# Patient Record
(demographics unavailable — no encounter records)

---

## 2024-11-14 NOTE — HISTORY OF PRESENT ILLNESS
[FreeTextEntry1] : 17 y/o F here for pediatric weight management program evaluation. Referred by Dr. [facility] Hx: Obesity, Irregular periods Patient's Goal: Lose weight, eat more fruits and vegetables Patient's concerns: doesn't feel comfortable Parent's Concerns: wants daughter to learn to eat healthier and be able to support her  Dietary Intake Review: Food recall- B- 1/2 bagel, L- hotdog and corn, chips, snack- pasta salad with oranges, D- turkey burger on vickie with sweet potato fries, Dessert- cake Enjoys salads, cucumbers, shrimp, chicken, hummus Servings of fruit: 1 Servings of Vegetables: 2 Eats dinner at the table with family: 7 x per week Eats breakfast : 4 x per week Fast food or take-out: 4 x per week, eats take out, no fast food Water Intake: 5 bottles of water per day Soda/Juice/or Gatorade: juice sometimes, no soda, no gateraid Milk: no Physical Activity: Enjoys swimming, was on the swim team last year[place] and active throughout the day Sleep: ? off and on TV/ computer or phone in bedroom- yes Gyn Hx: Menarche- 12 years old, LMP- 6/30/23, has Irregular, long, and heavy periods. Denies hirsuitism, + acne  8/29/23: Lost 11 lbs. Feels well, has been tolerating 3 tabs of metformin per day. Denies GI side effects and feels that it is helping with portion control. Able to fit into clothes from a few years ago. Reviewed lab results with pt and her mother- given print out to bring to her upcoming GYN apt. Le reports making healthier food choices and feels full quicker.  10/3/23: Lost 23 lbs total. Feels better in clothes and feels that metformin was helpful with cravings . Got up to 4 tabs per day, but started to feel gas pains and vomited one night when she took it late at night. Reports eating better overall and smaller portions. Saw GYN who plans on doing a US and starting birth control.  11/7/23: Lost 10 lbs, down 33 lbs total. Has been able to tolerate 500-1000 mg of metformin daily, starts to get stomach discomfort when taking 2 tabs daily. Continues to focus on eating healthier and limiting snacking to 2 times per day. Feels less hunger while taking medication. Walking for exercise. Plans on starting birth control  1/25/24: B- granola bar, L- sandwhich and chips and cucumber, D- meat, vegetable, and potato. Feels more hungry since she if off metformin x 3 weeks. + Dizziness with brisk movement, notices when getting out of bed quickly or standing quickly. Denies feeling lightheaded or diaphoretic. BP lower since weight loss. Noticed that the dizziness started after starting Blisovi fe OCP. Plans on changing to [place] this week instead. Discontinued taking metformin 3 weeks ago and continues to experience these symptoms. Does report taking metformin with advil sinus, but continues to have symptoms of low BP while off metformin.  2/15/24: Feels better overall. 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 Feels better after switching oral contraceptive to [place]is. Saw ENT and currently taking antibiotics and steroids for sinus issues. Eating healthy foods throughout the day, but has trouble consistently eating breakfast. Open to starting back on Metformin  4/30/24: Lost 48 lbs total. Discontinued taking birth control d/t side effects. Taking & tolerating metformin 500 mg with dinner. Incorporating breakfast lately B- apple with cheerios, sometimes skips lunch so will eat a small meal after school or some snacks, D- regular meal. Walks 1-2 miles most days, playing tennis today.  11/14/24: Lost an additional 6 lbs. In a school play currently. Continues to take metformin 750 mg with evening meal, feels that it isn't as effective with additional weight loss or cravings. Continues to make healthy food choices overall. Periods are very irregular, denies taking birth control at present, felt that it was making her gain weight. Wants to lose additional weight, wants to discuss option for injectable medications for weight.

## 2024-11-14 NOTE — ASSESSMENT
[FreeTextEntry1] : Continue healthy lifestyle Discussed the option to treat obesity with GLP1 medication. Patient denies contraindications to taking: denies family hx or personal history of medullary thyroid carcinoma or MEN 2, denies history of pancreatitis/gallbladder disease/hypoglycemia/renal impairment/ suicidal ideation.  Discussed mechanism of action. Reviewed side effects including: N/V/D/C, injection site reactions, headaches, low blood sugar, dizziness, abdominal pain, and fatigue. Reviewed ways to decrease nausea including: eating bland/lowfat foods, eating foods that contain water, and avoiding lying flat after eating. Discussed injection education & titration schedule. Will try for wegovy first, explained the option to use a coupon if it is not covered RTO in 1 month

## 2024-12-12 NOTE — HISTORY OF PRESENT ILLNESS
[FreeTextEntry1] : 15 y/o F here for pediatric weight management program evaluation. Referred by Dr. [facility] Hx: Obesity, Irregular periods Patient's Goal: Lose weight, eat more fruits and vegetables Patient's concerns: doesn't feel comfortable Parent's Concerns: wants daughter to learn to eat healthier and be able to support her  Dietary Intake Review: Food recall- B- 1/2 bagel, L- hotdog and corn, chips, snack- pasta salad with oranges, D- turkey burger on vickie with sweet potato fries, Dessert- cake Enjoys salads, cucumbers, shrimp, chicken, hummus Servings of fruit: 1 Servings of Vegetables: 2 Eats dinner at the table with family: 7 x per week Eats breakfast : 4 x per week Fast food or take-out: 4 x per week, eats take out, no fast food Water Intake: 5 bottles of water per day Soda/Juice/or Gatorade: juice sometimes, no soda, no gateraid Milk: no Physical Activity: Enjoys swimming, was on the swim team last year, [place] and active throughout the day Sleep: ? off and on TV/ computer or phone in bedroom- yes Gyn Hx: Menarche- 12 years old, LMP- 6/30/23, has Irregular, long, and heavy periods. Denies hirsuitism, + acne  8/29/23: Lost 11 lbs. Feels well, has been tolerating 3 tabs of metformin per day. Denies GI side effects and feels that it is helping with portion control. Able to fit into clothes from a few years ago. Reviewed lab results with pt and her mother- given print out to bring to her upcoming GYN apt. Le reports making healthier food choices and feels full quicker.  10/3/23: Lost 23 lbs total. Feels better in clothes and feels that metformin was helpful with cravings . Got up to 4 tabs per day, but started to feel gas pains and vomited one night when she took it late at night. Reports eating better overall and smaller portions. Saw GYN who plans on doing a US and starting birth control.  11/7/23: Lost 10 lbs, down 33 lbs total. Has been able to tolerate 500-1000 mg of metformin daily, starts to get stomach discomfort when taking 2 tabs daily. Continues to focus on eating healthier and limiting snacking to 2 times per day. Feels less hunger while taking medication. Walking for exercise. Plans on starting birth control  1/25/24: B- granola bar, L- sandwhich and chips and cucumber, D- meat, vegetable, and potato. Feels more hungry since she if off metformin x 3 weeks. + Dizziness with brisk movement, notices when getting out of bed quickly or standing quickly. Denies feeling lightheaded or diaphoretic. BP lower since weight loss. Noticed that the dizziness started after starting Blisovi fe OCP. Plans on changing to [place] this week instead. Discontinued taking metformin 3 weeks ago and continues to experience these symptoms. Does report taking metformin with advil sinus, but continues to have symptoms of low BP while off metformin.  2/15/24: Feels better overall. 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 Feels better after switching oral contraceptive to louis. Saw ENT and currently taking antibiotics and steroids for sinus issues. Eating healthy foods throughout the day, but has trouble consistently eating breakfast. Open to starting back on Metformin  4/30/24: Lost 48 lbs total. Discontinued taking birth control d/t side effects. Taking & tolerating metformin 500 mg with dinner. Incorporating breakfast lately B- apple with cheerios, sometimes skips lunch so will eat a small meal after school or some snacks, D- regular meal. Walks 1-2 miles most days, playing tennis today.  11/14/24: Lost an additional 6 lbs. In a school play currently. Continues to take metformin 750 mg with evening meal, feels that it isn't as effective with additional weight loss or cravings. Continues to make healthy food choices overall. Periods are very irregular, denies taking birth control at present, felt that it was making her gain weight. Wants to lose additional weight, wants to discuss option for injectable medications for weight.   12/12/24: Lost 6 lbs. Continues to take metformin 750 mg tab once daily, sometimes inconsistently. Wants to discuss option for additional medicine. Insurance denied coverage for wegovy, wants to discuss self pay instead. Reports being less hungry overall, eats 2 meals per day and has been active with school plays.

## 2024-12-12 NOTE — ASSESSMENT
[FreeTextEntry1] : Plan to start the Semaglutide treatment is agreed upon. Proposal to purchase semaglutide (Ozempic) for self-pay at the best available rate and self-administer the drug at the prescribed dose. Continue the Metformin treatment on a consistent basis and alongside the new treatment. - Plan : - Continue with the medication Metformin, Le will continue to take her usual 750 mg per day. - Begin the treatment of semaglutide, with the first injection administered at the follow-up visit in January. - Le to continue eating two meals a day and performing in her plays to maintain physical activity. - Follow-up appointment scheduled for January 2nd to administer the first dose of semaglutide.

## 2025-01-02 NOTE — HISTORY OF PRESENT ILLNESS
[FreeTextEntry1] : 17 y/o F here for pediatric weight management program evaluation. Referred by Dr. [facility] Hx: Obesity, Irregular periods Patient's Goal: Lose weight, eat more fruits and vegetables Patient's concerns: doesn't feel comfortable Parent's Concerns: wants daughter to learn to eat healthier and be able to support her  Dietary Intake Review: Food recall- B- 1/2 bagel, L- hotdog and corn, chips, snack- pasta salad with oranges, D- turkey burger on vickie with sweet potato fries, Dessert- cake Enjoys salads, cucumbers, shrimp, chicken, hummus Servings of fruit: 1 Servings of Vegetables: 2 Eats dinner at the table with family: 7 x per week Eats breakfast : 4 x per week Fast food or take-out: 4 x per week, eats take out, no fast food Water Intake: 5 bottles of water per day Soda/Juice/or Gatorade: juice sometimes, no soda, no gateraid Milk: no Physical Activity: Enjoys swimming, was on the swim team last year, [place] and active throughout the day Sleep: ? off and on TV/ computer or phone in bedroom- yes Gyn Hx: Menarche- 12 years old, LMP- 6/30/23, has Irregular, long, and heavy periods. Denies hirsuitism, + acne  8/29/23: Lost 11 lbs. Feels well, has been tolerating 3 tabs of metformin per day. Denies GI side effects and feels that it is helping with portion control. Able to fit into clothes from a few years ago. Reviewed lab results with pt and her mother- given print out to bring to her upcoming GYN apt. Le reports making healthier food choices and feels full quicker.  10/3/23: Lost 23 lbs total. Feels better in clothes and feels that metformin was helpful with cravings . Got up to 4 tabs per day, but started to feel gas pains and vomited one night when she took it late at night. Reports eating better overall and smaller portions. Saw GYN who plans on doing a US and starting birth control.  11/7/23: Lost 10 lbs, down 33 lbs total. Has been able to tolerate 500-1000 mg of metformin daily, starts to get stomach discomfort when taking 2 tabs daily. Continues to focus on eating healthier and limiting snacking to 2 times per day. Feels less hunger while taking medication. Walking for exercise. Plans on starting birth control  1/25/24: B- granola bar, L- sandwhich and chips and cucumber, D- meat, vegetable, and potato. Feels more hungry since she if off metformin x 3 weeks. + Dizziness with brisk movement, notices when getting out of bed quickly or standing quickly. Denies feeling lightheaded or diaphoretic. BP lower since weight loss. Noticed that the dizziness started after starting Blisovi fe OCP. Plans on changing to [place] this week instead. Discontinued taking metformin 3 weeks ago and continues to experience these symptoms. Does report taking metformin with advil sinus, but continues to have symptoms of low BP while off metformin.  2/15/24: Feels better overall. 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 Feels better after switching oral contraceptive to louis. Saw ENT and currently taking antibiotics and steroids for sinus issues. Eating healthy foods throughout the day, but has trouble consistently eating breakfast. Open to starting back on Metformin  4/30/24: Lost 48 lbs total. Discontinued taking birth control d/t side effects. Taking & tolerating metformin 500 mg with dinner. Incorporating breakfast lately B- apple with cheerios, sometimes skips lunch so will eat a small meal after school or some snacks, D- regular meal. Walks 1-2 miles most days, playing tennis today.  11/14/24: Lost an additional 6 lbs. In a school play currently. Continues to take metformin 750 mg with evening meal, feels that it isn't as effective with additional weight loss or cravings. Continues to make healthy food choices overall. Periods are very irregular, denies taking birth control at present, felt that it was making her gain weight. Wants to lose additional weight, wants to discuss option for injectable medications for weight.   12/12/24: Lost 6 lbs. Continues to take metformin 750 mg tab once daily, sometimes inconsistently. Wants to discuss option for additional medicine. Insurance denied coverage for wegovy, wants to discuss self pay instead. Reports being less hungry overall, eats 2 meals per day and has been active with school plays.    1/2/25: Patient is starting Ozempic treatment, brought in medication today- has 2 mg dose pen and need education to start treatment. Has been inconsistent with metformin treatment while on school break.  Patient recently visited [place], gained 3 lbs during holiday season.

## 2025-01-02 NOTE — ASSESSMENT
[FreeTextEntry1] : Can continue metformin and healthy lifestyle Mother and father present for education- Discussed ozempic click based therapy dosing. Patient denies contraindications to taking: denies family hx or personal history of medullary thyroid carcinoma or MEN 2, denies history of pancreatitis/gallbladder disease/hypoglycemia.  Discussed mechanism of action. Reviewed side effects including: N/V/D/C, injection site reactions, headaches, low blood sugar, dizziness, abdominal pain, and fatigue. Reviewed ways to decrease nausea including: eating bland/lowfat foods, eating foods that contain water, and avoiding lying flat after eating. Discussed injection education & titration schedule. Will start with 9 clicks weekly and check in RTO in 1 month

## 2025-01-30 NOTE — ASSESSMENT
[FreeTextEntry1] : Discussed the importance of eating protein and fiber at each meal Continue medication at 0.25 mg as it is working well  Given dose together in the office- 9 clicks of ozempic  Enc to restart metformin Ref to GYN for heavier periods  RTO In 1 month

## 2025-02-20 NOTE — HISTORY OF PRESENT ILLNESS
[FreeTextEntry1] : 17 y/o F here for pediatric weight management program evaluation. Referred by Dr. [facility] Hx: Obesity, Irregular periods Patient's Goal: Lose weight, eat more fruits and vegetables Patient's concerns: doesn't feel comfortable Parent's Concerns: wants daughter to learn to eat healthier and be able to support her  Dietary Intake Review: Food recall- B- 1/2 bagel, L- hotdog and corn, chips, snack- pasta salad with oranges, D- turkey burger on vickie with sweet potato fries, Dessert- cake Enjoys salads, cucumbers, shrimp, chicken, hummus Servings of fruit: 1 Servings of Vegetables: 2 Eats dinner at the table with family: 7 x per week Eats breakfast : 4 x per week Fast food or take-out: 4 x per week, eats take out, no fast food Water Intake: 5 bottles of water per day Soda/Juice/or Gatorade: juice sometimes, no soda, no gateraid Milk: no Physical Activity: Enjoys swimming, was on the swim team last year, [place] and active throughout the day Sleep: ? off and on TV/ computer or phone in bedroom- yes Gyn Hx: Menarche- 12 years old, LMP- 6/30/23, has Irregular, long, and heavy periods. Denies hirsuitism, + acne  8/29/23: Lost 11 lbs. Feels well, has been tolerating 3 tabs of metformin per day. Denies GI side effects and feels that it is helping with portion control. Able to fit into clothes from a few years ago. Reviewed lab results with pt and her mother- given print out to bring to her upcoming GYN apt. Le reports making healthier food choices and feels full quicker.  10/3/23: Lost 23 lbs total. Feels better in clothes and feels that metformin was helpful with cravings . Got up to 4 tabs per day, but started to feel gas pains and vomited one night when she took it late at night. Reports eating better overall and smaller portions. Saw GYN who plans on doing a US and starting birth control.  11/7/23: Lost 10 lbs, down 33 lbs total. Has been able to tolerate 500-1000 mg of metformin daily, starts to get stomach discomfort when taking 2 tabs daily. Continues to focus on eating healthier and limiting snacking to 2 times per day. Feels less hunger while taking medication. Walking for exercise. Plans on starting birth control  1/25/24: B- granola bar, L- sandwhich and chips and cucumber, D- meat, vegetable, and potato. Feels more hungry since she if off metformin x 3 weeks. + Dizziness with brisk movement, notices when getting out of bed quickly or standing quickly. Denies feeling lightheaded or diaphoretic. BP lower since weight loss. Noticed that the dizziness started after starting Blisovi fe OCP. Plans on changing to [place] this week instead. Discontinued taking metformin 3 weeks ago and continues to experience these symptoms. Does report taking metformin with advil sinus, but continues to have symptoms of low BP while off metformin.  2/15/24: Feels better overall. 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 Feels better after switching oral contraceptive to louis. Saw ENT and currently taking antibiotics and steroids for sinus issues. Eating healthy foods throughout the day, but has trouble consistently eating breakfast. Open to starting back on Metformin  4/30/24: Lost 48 lbs total. Discontinued taking birth control d/t side effects. Taking & tolerating metformin 500 mg with dinner. Incorporating breakfast lately B- apple with cheerios, sometimes skips lunch so will eat a small meal after school or some snacks, D- regular meal. Walks 1-2 miles most days, playing tennis today.  11/14/24: Lost an additional 6 lbs. In a school play currently. Continues to take metformin 750 mg with evening meal, feels that it isn't as effective with additional weight loss or cravings. Continues to make healthy food choices overall. Periods are very irregular, denies taking birth control at present, felt that it was making her gain weight. Wants to lose additional weight, wants to discuss option for injectable medications for weight.   12/12/24: Lost 6 lbs. Continues to take metformin 750 mg tab once daily, sometimes inconsistently. Wants to discuss option for additional medicine. Insurance denied coverage for wegovy, wants to discuss self pay instead. Reports being less hungry overall, eats 2 meals per day and has been active with school plays.    1/2/25: Patient is starting Ozempic treatment, brought in medication today- has 2 mg dose pen and need education to start treatment. Has been inconsistent with metformin treatment while on school break.  Patient recently visited [place], gained 3 lbs during holiday season.   1/30/25: Doing well overall- taking ozempic 0.25 mg weekly and notices a decreased appetite. Eats 2-3 small meals throughout the day. Drinking water. Has been on and off metformin. Feels well overall, but surprised that she hasn't seen more movement in the scale. Went down a dress size. Doing [employer] musical at school. Getting periods every 3 months, more heavy lately.   2/20/25: Doing well, lost an additional 7 lbs, taking ozempic 0.25 mg weekly. C/o having heavy irregular periods, feels dizzy when she gets out of bed quickly or stands up quickly. BP Low today. Plans on seeing GYN tomorrow. Eats 2-3 meals daily. Normal BMs

## 2025-02-20 NOTE — ASSESSMENT
[FreeTextEntry1] : Continue healthy lifestyle  Can add salt to food  Check labs tomorrow morning including iron studies as she is struggling with heavy periods and feeling lightheaded with brisk standing up RTO in 1 month

## 2025-03-27 NOTE — HISTORY OF PRESENT ILLNESS
[FreeTextEntry1] : 19 y/o F here for pediatric weight management program evaluation. Referred by Dr. [facility] Hx: Obesity, Irregular periods Patient's Goal: Lose weight, eat more fruits and vegetables  Patient's concerns: doesn't feel comfortable  Parent's Concerns: wants  daughter to learn to eat healthier and be able to support her   Dietary Intake Review:  Food recall- B- 1/2 bagel, L- hotdog and corn, chips, snack- pasta salad with oranges, D- turkey burger on vickie with sweet potato fries, Dessert- cake  Enjoys salads, cucumbers, shrimp, chicken, hummus  Servings of fruit: 1 Servings of Vegetables: 2 Eats dinner at the table with family: 7 x per week  Eats breakfast : 4 x per week  Fast food or take-out: 4 x per week, eats take out, no fast food  Water Intake: 5 bottles of water per day  Soda/Juice/or Gatorade: juice sometimes, no soda, no gateraid Milk:  no Physical Activity: Enjoys swimming, was on the swim team last year[place] and [place] throughout the day Sleep: ? off and on  TV/ computer or phone in bedroom- yes Gyn Hx: Menarche- 12 years old, LMP- 6/30/23, has Irregular, long, and heavy periods. Denies hirsuitism, + acne  8/29/23: Lost 11 lbs. Feels well, has been tolerating 3 tabs of metformin per day. Denies GI side effects and feels that it is helping with portion control. Able to fit into clothes from a few years ago. Reviewed lab results with pt and her mother- given print out to bring to her upcoming GYN apt. Le reports making healthier food choices and feels full quicker.   10/3/23: Lost 23 lbs total. Feels better in clothes and feels that metformin was helpful with cravings . Got up to 4 tabs per day, but started to feel gas pains and vomited one night when she took it late at night. Reports eating better overall and smaller portions. Saw GYN who plans on doing a US and starting birth control.  11/7/23: Lost 10 lbs, down 33 lbs total. Has been able to tolerate 500-1000 mg of metformin daily, starts to get stomach discomfort when taking 2 tabs daily. Continues to focus on eating healthier and limiting snacking to 2 times per day. Feels less hunger while taking medication. Walking for exercise. Plans on starting birth control   1/25/24: B- granola bar, L- sandwhich and chips and cucumber, D- meat, vegetable, and potato. Feels more hungry since she if off metformin x 3 weeks. + Dizziness with brisk movement, notices when getting out of bed quickly or standing quickly. Denies feeling lightheaded or diaphoretic. BP lower since weight loss. Noticed that the dizziness started after starting Blis[place] fe OCP. Plans on changing to [place] this week instead. Discontinued taking metformin 3 weeks ago and continues to experience these symptoms. Does report taking metformin with advil sinus, but continues to have symptoms of low BP while off metformin.  2/15/24: Feels better overall. 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 Feels better after switching oral contraceptive to [place]. Saw ENT and currently taking antibiotics and steroids for sinus issues. Eating healthy foods throughout the day, but has trouble consistently eating breakfast. Open to starting back on Metformin    4/30/24: Lost 68 lbs total. Discontinued taking birth control d/t side effects. Taking & toleraring metformin 500 mg with dinner. Incorporating breakfast lately B- apple with cheerios, sometimes skips lunch so will eat a small meal after school or some snacks, D- regular meal. Walks 1-2 miles most days, playing tennis today.   3/27/25:Lost additional 6 lbs since last visit  - 74 lbs lost total. Continues to tolerate metformin and ozempic 9 clicks (close to 0.25 mg weekly). Patient has been diligently following the weight loss program and made significant achievements in her weight progress. She reported facing issues with consumption of red meat and fried foods. She mentioned feeling light-headed and dizzy at times during her current regimen.

## 2025-03-27 NOTE — ASSESSMENT
[FreeTextEntry1] : Summary : Plan is to continue the current weight loss regimen without increasing doses. Consideration may be taken in the future to stop her current medication and put her on metformin solely, based on her progress. The patient is advised to refrain from eating red meat and is cautioned about the intake of fried foods and specific foods like mac and cheese since they are causing issues. - Plan : - Continue on with current medication but monitor for any recurring side effects  - Ensure enough protein, fiber, and salt are included in her daily diet  - Avoidance of red meat and cautious intake of fried foods  - Encouragement of physical activity like daily walks  - The possible implementation of birth control to manage irregular periods

## 2025-05-12 NOTE — HISTORY OF PRESENT ILLNESS
[Home] : at home, [unfilled] , at the time of the visit. [Other Location: e.g. Home (Enter Location, City,State)___] : at [unfilled] [Telehealth (audio & video)] : This visit was provided via telehealth using real-time 2-way audio visual technology. [Verbal consent obtained from patient] : the patient, [unfilled] [FreeTextEntry1] : 15 y/o F here for pediatric weight management program evaluation. Referred by Dr. [facility] Hx: Obesity, Irregular periods Patient's Goal: Lose weight, eat more fruits and vegetables Patient's concerns: doesn't feel comfortable Parent's Concerns: wants daughter to learn to eat healthier and be able to support her  Dietary Intake Review: Food recall- B- 1/2 bagel, L- hotdog and corn, chips, snack- pasta salad with oranges, D- turkey burger on vickie with sweet potato fries, Dessert- cake Enjoys salads, cucumbers, shrimp, chicken, hummus Servings of fruit: 1 Servings of Vegetables: 2 Eats dinner at the table with family: 7 x per week Eats breakfast : 4 x per week Fast food or take-out: 4 x per week, eats take out, no fast food Water Intake: 5 bottles of water per day Soda/Juice/or Gatorade: juice sometimes, no soda, no gateraid Milk: no Physical Activity: Enjoys swimming, was on the swim team last year, [place] and active throughout the day Sleep: ? off and on TV/ computer or phone in bedroom- yes Gyn Hx: Menarche- 12 years old, LMP- 6/30/23, has Irregular, long, and heavy periods. Denies hirsuitism, + acne  8/29/23: Lost 11 lbs. Feels well, has been tolerating 3 tabs of metformin per day. Denies GI side effects and feels that it is helping with portion control. Able to fit into clothes from a few years ago. Reviewed lab results with pt and her mother- given print out to bring to her upcoming GYN apt. Le reports making healthier food choices and feels full quicker.  10/3/23: Lost 23 lbs total. Feels better in clothes and feels that metformin was helpful with cravings . Got up to 4 tabs per day, but started to feel gas pains and vomited one night when she took it late at night. Reports eating better overall and smaller portions. Saw GYN who plans on doing a US and starting birth control.  11/7/23: Lost 10 lbs, down 33 lbs total. Has been able to tolerate 500-1000 mg of metformin daily, starts to get stomach discomfort when taking 2 tabs daily. Continues to focus on eating healthier and limiting snacking to 2 times per day. Feels less hunger while taking medication. Walking for exercise. Plans on starting birth control  1/25/24: B- granola bar, L- sandwhich and chips and cucumber, D- meat, vegetable, and potato. Feels more hungry since she if off metformin x 3 weeks. + Dizziness with brisk movement, notices when getting out of bed quickly or standing quickly. Denies feeling lightheaded or diaphoretic. BP lower since weight loss. Noticed that the dizziness started after starting Bl[place] fe OCP. Plans on changing to [place] this week instead. Discontinued taking metformin 3 weeks ago and continues to experience these symptoms. Does report taking metformin with advil sinus, but continues to have symptoms of low BP while off metformin.  2/15/24: Feels better overall. 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 Feels better after switching oral contraceptive to [place]. Saw ENT and currently taking antibiotics and steroids for sinus issues. Eating healthy foods throughout the day, but has trouble consistently eating breakfast. Open to starting back on Metformin  4/30/24: Lost 48 lbs total. Discontinued taking birth control d/t side effects. Taking & tolerating metformin 500 mg with dinner. Incorporating breakfast lately B- apple with cheerios, sometimes skips lunch so will eat a small meal after school or some snacks, D- regular meal. Walks 1-2 miles most days, playing tennis today.  11/14/24: Lost an additional 6 lbs. In a school play currently. Continues to take metformin 750 mg with evening meal, feels that it isn't as effective with additional weight loss or cravings. Continues to make healthy food choices overall. Periods are very irregular, denies taking birth control at present, felt that it was making her gain weight. Wants to lose additional weight, wants to discuss option for injectable medications for weight.   12/12/24: Lost 6 lbs. Continues to take metformin 750 mg tab once daily, sometimes inconsistently. Wants to discuss option for additional medicine. Insurance denied coverage for wegovy, wants to discuss self pay instead. Reports being less hungry overall, eats 2 meals per day and has been active with school plays.    1/2/25: Patient is starting Ozempic treatment, brought in medication today- has 2 mg dose pen and need education to start treatment. Has been inconsistent with metformin treatment while on school break.  Patient recently visited [place], gained 3 lbs during holiday season.   1/30/25: Doing well overall- taking ozempic 0.25 mg weekly and notices a decreased appetite. Eats 2-3 small meals throughout the day. Drinking water. Has been on and off metformin. Feels well overall, but surprised that she hasn't seen more movement in the scale. Went down a dress size. Doing [employer] musical at school. Getting periods every 3 months, more heavy lately.   2/20/25: Doing well, lost an additional 7 lbs, taking ozempic 0.25 mg weekly. C/o having heavy irregular periods, feels dizzy when she gets out of bed quickly or stands up quickly. BP Low today. Plans on seeing GYN tomorrow. Eats 2-3 meals daily. Normal BMs   5/12/25: Taking ozempic 0.25 mg, discontinued taking metformin on her own, last dose over a month ago. Reports having more dizzy symptoms and low BP at a recent Urgent Care apt. Eating 2 melas per day, had an Acai bowl today. Wants to increase dose of medicine, but not sure if this is the right choice at this time.

## 2025-05-12 NOTE — ASSESSMENT
[FreeTextEntry1] : Recommend continuing ozempic 0.25 mg weekly, advise against increasing the dose at this time Recommend journaling dietary intake and brining this information to next apt  Scheduled an in person apt on Thursday to check BP Continue to focus on eating 2 meals with protein and fiber each day, getting adequate water daily

## 2025-05-15 NOTE — ASSESSMENT
[FreeTextEntry1] : - Summary : Continue current medication dosage, focus on maintaining weight, and address dizziness symptoms. - Plan : - Maintain current medication dosage; patient to consider trying 0.5mg dose if desired, but monitor for increased dizziness - Incorporate low-sugar electrolyte drinks (e.g., liquid IV) and increase salt intake to address dizziness - Ref back to cardiology for low BP and dizziness, BP checks do not meet criteria for orthostatic hypotension which is re-assuring - Start strength training exercises, focusing on core strength - Ensure adequate protein and fiber intake, eating 2-3 meals per day - Prescribe topical cream for occasional skin rashes of lower abdomen - Consider referral to plastic surgery for evaluation of excess skin if discomfort persists - Follow up to monitor blood pressure and weight - Patient to avoid red meat if it continues to cause gastrointestinal discomfort

## 2025-05-15 NOTE — HISTORY OF PRESENT ILLNESS
[FreeTextEntry1] : 17 y/o F here for pediatric weight management program evaluation. Referred by Dr. [facility] Hx: Obesity, Irregular periods Patient's Goal: Lose weight, eat more fruits and vegetables Patient's concerns: doesn't feel comfortable Parent's Concerns: wants daughter to learn to eat healthier and be able to support her  Dietary Intake Review: Food recall- B- 1/2 bagel, L- hotdog and corn, chips, snack- pasta salad with oranges, D- turkey burger on vickie with sweet potato fries, Dessert- cake Enjoys salads, cucumbers, shrimp, chicken, hummus Servings of fruit: 1 Servings of Vegetables: 2 Eats dinner at the table with family: 7 x per week Eats breakfast : 4 x per week Fast food or take-out: 4 x per week, eats take out, no fast food Water Intake: 5 bottles of water per day Soda/Juice/or Gatorade: juice sometimes, no soda, no gateraid Milk: no Physical Activity: Enjoys swimming, was on the swim team last year, [place] and active throughout the day Sleep: ? off and on TV/ computer or phone in bedroom- yes Gyn Hx: Menarche- 12 years old, LMP- 6/30/23, has Irregular, long, and heavy periods. Denies hirsuitism, + acne  8/29/23: Lost 11 lbs. Feels well, has been tolerating 3 tabs of metformin per day. Denies GI side effects and feels that it is helping with portion control. Able to fit into clothes from a few years ago. Reviewed lab results with pt and her mother- given print out to bring to her upcoming GYN apt. Le reports making healthier food choices and feels full quicker.  10/3/23: Lost 23 lbs total. Feels better in clothes and feels that metformin was helpful with cravings . Got up to 4 tabs per day, but started to feel gas pains and vomited one night when she took it late at night. Reports eating better overall and smaller portions. Saw GYN who plans on doing a US and starting birth control.  11/7/23: Lost 10 lbs, down 33 lbs total. Has been able to tolerate 500-1000 mg of metformin daily, starts to get stomach discomfort when taking 2 tabs daily. Continues to focus on eating healthier and limiting snacking to 2 times per day. Feels less hunger while taking medication. Walking for exercise. Plans on starting birth control  1/25/24: B- granola bar, L- sandwhich and chips and cucumber, D- meat, vegetable, and potato. Feels more hungry since she if off metformin x 3 weeks. + Dizziness with brisk movement, notices when getting out of bed quickly or standing quickly. Denies feeling lightheaded or diaphoretic. BP lower since weight loss. Noticed that the dizziness started after starting Bl[place] fe OCP. Plans on changing to [place] this week instead. Discontinued taking metformin 3 weeks ago and continues to experience these symptoms. Does report taking metformin with advil sinus, but continues to have symptoms of low BP while off metformin.  2/15/24: Feels better overall. 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 Feels better after switching oral contraceptive to [place]is. Saw ENT and currently taking antibiotics and steroids for sinus issues. Eating healthy foods throughout the day, but has trouble consistently eating breakfast. Open to starting back on Metformin  4/30/24: Lost 48 lbs total. Discontinued taking birth control d/t side effects. Taking & tolerating metformin 500 mg with dinner. Incorporating breakfast lately B- apple with cheerios, sometimes skips lunch so will eat a small meal after school or some snacks, D- regular meal. Walks 1-2 miles most days, playing tennis today.  11/14/24: Lost an additional 6 lbs. In a school play currently. Continues to take metformin 750 mg with evening meal, feels that it isn't as effective with additional weight loss or cravings. Continues to make healthy food choices overall. Periods are very irregular, denies taking birth control at present, felt that it was making her gain weight. Wants to lose additional weight, wants to discuss option for injectable medications for weight.   12/12/24: Lost 6 lbs. Continues to take metformin 750 mg tab once daily, sometimes inconsistently. Wants to discuss option for additional medicine. Insurance denied coverage for wegovy, wants to discuss self pay instead. Reports being less hungry overall, eats 2 meals per day and has been active with school plays.    1/2/25: Patient is starting Ozempic treatment, brought in medication today- has 2 mg dose pen and need education to start treatment. Has been inconsistent with metformin treatment while on school break.  Patient recently visited [place], gained 3 lbs during holiday season.   1/30/25: Doing well overall- taking ozempic 0.25 mg weekly and notices a decreased appetite. Eats 2-3 small meals throughout the day. Drinking water. Has been on and off metformin. Feels well overall, but surprised that she hasn't seen more movement in the scale. Went down a dress size. Doing [employer] musical at school. Getting periods every 3 months, more heavy lately.   2/20/25: Doing well, lost an additional 7 lbs, taking ozempic 0.25 mg weekly. C/o having heavy irregular periods, feels dizzy when she gets out of bed quickly or stands up quickly. BP Low today. Plans on seeing GYN tomorrow. Eats 2-3 meals daily. Normal BMs   5/12/25: Taking ozempic 0.25 mg, discontinued taking metformin on her own, last dose over a month ago. Reports having more dizzy symptoms and low BP at a recent Urgent Care apt. Eating 2 melas per day, had an Acai bowl today. Wants to increase dose of medicine, but not sure if this is the right choice at this time.   5/15/25: Patient reports feeling a little dizzy, especially when getting off the couch. Dizziness occurs daily. Patient had an acai bowl for breakfast and lunch. Patient experiences visual disturbances ('all black' or spots) when standing up quickly. Able to get through the day with adequate energy. Reports increased appetite lately on the 0.25 mg dose of ozempic, wondering if she could increase the dose. Saw cardiology about a year ago who had recommended increase in salt intake or Liquid IV to help with dizziness, denies following this recommendation.

## 2025-05-15 NOTE — HISTORY OF PRESENT ILLNESS
[FreeTextEntry1] : 17 y/o F here for pediatric weight management program evaluation. Referred by Dr. [facility] Hx: Obesity, Irregular periods Patient's Goal: Lose weight, eat more fruits and vegetables Patient's concerns: doesn't feel comfortable Parent's Concerns: wants daughter to learn to eat healthier and be able to support her  Dietary Intake Review: Food recall- B- 1/2 bagel, L- hotdog and corn, chips, snack- pasta salad with oranges, D- turkey burger on vickie with sweet potato fries, Dessert- cake Enjoys salads, cucumbers, shrimp, chicken, hummus Servings of fruit: 1 Servings of Vegetables: 2 Eats dinner at the table with family: 7 x per week Eats breakfast : 4 x per week Fast food or take-out: 4 x per week, eats take out, no fast food Water Intake: 5 bottles of water per day Soda/Juice/or Gatorade: juice sometimes, no soda, no gateraid Milk: no Physical Activity: Enjoys swimming, was on the swim team last year[place] and [place] throughout the day Sleep: ? off and on TV/ computer or phone in bedroom- yes Gyn Hx: Menarche- 12 years old, LMP- 6/30/23, has Irregular, long, and heavy periods. Denies hirsuitism, + acne  8/29/23: Lost 11 lbs. Feels well, has been tolerating 3 tabs of metformin per day. Denies GI side effects and feels that it is helping with portion control. Able to fit into clothes from a few years ago. Reviewed lab results with pt and her mother- given print out to bring to her upcoming GYN apt. Le reports making healthier food choices and feels full quicker.  10/3/23: Lost 23 lbs total. Feels better in clothes and feels that metformin was helpful with cravings . Got up to 4 tabs per day, but started to feel gas pains and vomited one night when she took it late at night. Reports eating better overall and smaller portions. Saw GYN who plans on doing a US and starting birth control.  11/7/23: Lost 10 lbs, down 33 lbs total. Has been able to tolerate 500-1000 mg of metformin daily, starts to get stomach discomfort when taking 2 tabs daily. Continues to focus on eating healthier and limiting snacking to 2 times per day. Feels less hunger while taking medication. Walking for exercise. Plans on starting birth control  1/25/24: B- granola bar, L- sandwhich and chips and cucumber, D- meat, vegetable, and potato. Feels more hungry since she if off metformin x 3 weeks. + Dizziness with brisk movement, notices when getting out of bed quickly or standing quickly. Denies feeling lightheaded or diaphoretic. BP lower since weight loss. Noticed that the dizziness started after starting Blisovi fe OCP. Plans on changing to [place] this week instead. Discontinued taking metformin 3 weeks ago and continues to experience these symptoms. Does report taking metformin with advil sinus, but continues to have symptoms of low BP while off metformin.  2/15/24: Feels better overall. 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 Feels better after switching oral contraceptive to [place]is. Saw ENT and currently taking antibiotics and steroids for sinus issues. Eating healthy foods throughout the day, but has trouble consistently eating breakfast. Open to starting back on Metformin  4/30/24: Lost 48 lbs total. Discontinued taking birth control d/t side effects. Taking & tolerating metformin 500 mg with dinner. Incorporating breakfast lately B- apple with cheerios, sometimes skips lunch so will eat a small meal after school or some snacks, D- regular meal. Walks 1-2 miles most days, playing tennis today.  11/14/24: Lost an additional 6 lbs. In a school play currently. Continues to take metformin 750 mg with evening meal, feels that it isn't as effective with additional weight loss or cravings. Continues to make healthy food choices overall. Periods are very irregular, denies taking birth control at present, felt that it was making her gain weight. Wants to lose additional weight, wants to discuss option for injectable medications for weight.   12/12/24: Lost 6 lbs. Continues to take metformin 750 mg tab once daily, sometimes inconsistently. Wants to discuss option for additional medicine. Insurance denied coverage for wegovy, wants to discuss self pay instead. Reports being less hungry overall, eats 2 meals per day and has been active with school plays.    1/2/25: Patient is starting Ozempic treatment, brought in medication today- has 2 mg dose pen and need education to start treatment. Has been inconsistent with metformin treatment while on school break.  Patient recently visited [place], gained 3 lbs during holiday season.   1/30/25: Doing well overall- taking ozempic 0.25 mg weekly and notices a decreased appetite. Eats 2-3 small meals throughout the day. Drinking water. Has been on and off metformin. Feels well overall, but surprised that she hasn't seen more movement in the scale. Went down a dress size. Doing [employer] musical at school. Getting periods every 3 months, more heavy lately.   2/20/25: Doing well, lost an additional 7 lbs, taking ozempic 0.25 mg weekly. C/o having heavy irregular periods, feels dizzy when she gets out of bed quickly or stands up quickly. BP Low today. Plans on seeing GYN tomorrow. Eats 2-3 meals daily. Normal BMs   5/12/25: Taking ozempic 0.25 mg, discontinued taking metformin on her own, last dose over a month ago. Reports having more dizzy symptoms and low BP at a recent Urgent Care apt. Eating 2 melas per day, had an Acai bowl today. Wants to increase dose of medicine, but not sure if this is the right choice at this time.   5/15/25: Patient reports feeling a little dizzy, especially when getting off the couch. Dizziness occurs daily. Patient had an acai bowl for breakfast and lunch. Patient experiences visual disturbances ('all black' or spots) when standing up quickly. Able to get through the day with adequate energy. Reports increased appetite lately on the 0.25 mg dose of ozempic, wondering if she could increase the dose. Saw cardiology about a year ago who had recommended increase in salt intake or Liquid IV to help with dizziness, denies following this recommendation.

## 2025-06-10 NOTE — HISTORY OF PRESENT ILLNESS
[FreeTextEntry1] : 15 y/o F here for pediatric weight management program evaluation. Referred by Dr. [facility] Hx: Obesity, Irregular periods Patient's Goal: Lose weight, eat more fruits and vegetables Patient's concerns: doesn't feel comfortable Parent's Concerns: wants daughter to learn to eat healthier and be able to support her  Dietary Intake Review: Food recall- B- 1/2 bagel, L- hotdog and corn, chips, snack- pasta salad with oranges, D- turkey burger on vickie with sweet potato fries, Dessert- cake Enjoys salads, cucumbers, shrimp, chicken, hummus Servings of fruit: 1 Servings of Vegetables: 2 Eats dinner at the table with family: 7 x per week Eats breakfast : 4 x per week Fast food or take-out: 4 x per week, eats take out, no fast food Water Intake: 5 bottles of water per day Soda/Juice/or Gatorade: juice sometimes, no soda, no gateraid Milk: no Physical Activity: Enjoys swimming, was on the swim team last year, [place] and active throughout the day Sleep: ? off and on TV/ computer or phone in bedroom- yes Gyn Hx: Menarche- 12 years old, LMP- 6/30/23, has Irregular, long, and heavy periods. Denies hirsuitism, + acne  8/29/23: Lost 11 lbs. Feels well, has been tolerating 3 tabs of metformin per day. Denies GI side effects and feels that it is helping with portion control. Able to fit into clothes from a few years ago. Reviewed lab results with pt and her mother- given print out to bring to her upcoming GYN apt. Le reports making healthier food choices and feels full quicker.  10/3/23: Lost 23 lbs total. Feels better in clothes and feels that metformin was helpful with cravings . Got up to 4 tabs per day, but started to feel gas pains and vomited one night when she took it late at night. Reports eating better overall and smaller portions. Saw GYN who plans on doing a US and starting birth control.  11/7/23: Lost 10 lbs, down 33 lbs total. Has been able to tolerate 500-1000 mg of metformin daily, starts to get stomach discomfort when taking 2 tabs daily. Continues to focus on eating healthier and limiting snacking to 2 times per day. Feels less hunger while taking medication. Walking for exercise. Plans on starting birth control  1/25/24: B- granola bar, L- sandwhich and chips and cucumber, D- meat, vegetable, and potato. Feels more hungry since she if off metformin x 3 weeks. + Dizziness with brisk movement, notices when getting out of bed quickly or standing quickly. Denies feeling lightheaded or diaphoretic. BP lower since weight loss. Noticed that the dizziness started after starting Blisovi fe OCP. Plans on changing to [place] this week instead. Discontinued taking metformin 3 weeks ago and continues to experience these symptoms. Does report taking metformin with advil sinus, but continues to have symptoms of low BP while off metformin.  2/15/24: Feels better overall. 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 Feels better after switching oral contraceptive to [place]is. Saw ENT and currently taking antibiotics and steroids for sinus issues. Eating healthy foods throughout the day, but has trouble consistently eating breakfast. Open to starting back on Metformin  4/30/24: Lost 48 lbs total. Discontinued taking birth control d/t side effects. Taking & tolerating metformin 500 mg with dinner. Incorporating breakfast lately B- apple with cheerios, sometimes skips lunch so will eat a small meal after school or some snacks, D- regular meal. Walks 1-2 miles most days, playing tennis today.  11/14/24: Lost an additional 6 lbs. In a school play currently. Continues to take metformin 750 mg with evening meal, feels that it isn't as effective with additional weight loss or cravings. Continues to make healthy food choices overall. Periods are very irregular, denies taking birth control at present, felt that it was making her gain weight. Wants to lose additional weight, wants to discuss option for injectable medications for weight.   12/12/24: Lost 6 lbs. Continues to take metformin 750 mg tab once daily, sometimes inconsistently. Wants to discuss option for additional medicine. Insurance denied coverage for wegovy, wants to discuss self pay instead. Reports being less hungry overall, eats 2 meals per day and has been active with school plays.    1/2/25: Patient is starting Ozempic treatment, brought in medication today- has 2 mg dose pen and need education to start treatment. Has been inconsistent with metformin treatment while on school break.  Patient recently visited [place], gained 3 lbs during holiday season.   1/30/25: Doing well overall- taking ozempic 0.25 mg weekly and notices a decreased appetite. Eats 2-3 small meals throughout the day. Drinking water. Has been on and off metformin. Feels well overall, but surprised that she hasn't seen more movement in the scale. Went down a dress size. Doing [employer] musical at school. Getting periods every 3 months, more heavy lately.   2/20/25: Doing well, lost an additional 7 lbs, taking ozempic 0.25 mg weekly. C/o having heavy irregular periods, feels dizzy when she gets out of bed quickly or stands up quickly. BP Low today. Plans on seeing GYN tomorrow. Eats 2-3 meals daily. Normal BMs   5/12/25: Taking ozempic 0.25 mg, discontinued taking metformin on her own, last dose over a month ago. Reports having more dizzy symptoms and low BP at a recent Urgent Care apt. Eating 2 melas per day, had an Acai bowl today. Wants to increase dose of medicine, but not sure if this is the right choice at this time.   5/15/25: Patient reports feeling a little dizzy, especially when getting off the couch. Dizziness occurs daily. Patient had an acai bowl for breakfast and lunch. Patient experiences visual disturbances ('all black' or spots) when standing up quickly. Able to get through the day with adequate energy. Reports increased appetite lately on the 0.25 mg dose of ozempic, wondering if she could increase the dose. Saw cardiology about a year ago who had recommended increase in salt intake or Liquid IV to help with dizziness, denies following this recommendation.    6/10/25: Lost 8 lbs since last apt. Increased the dose to ozempic 0.5 mg weekly. The patient reports ongoing constipation issues since starting Ozempic. She experiences nausea and feels like she hasn't fully emptied her bowels. The patient has been taking three fiber supplements daily and two Senna. 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 She reports no changes in dizziness. Getting enough nourishment daily, water intake could improve.

## 2025-06-10 NOTE — HISTORY OF PRESENT ILLNESS
[FreeTextEntry1] : 15 y/o F here for pediatric weight management program evaluation. Referred by Dr. [facility] Hx: Obesity, Irregular periods Patient's Goal: Lose weight, eat more fruits and vegetables Patient's concerns: doesn't feel comfortable Parent's Concerns: wants daughter to learn to eat healthier and be able to support her  Dietary Intake Review: Food recall- B- 1/2 bagel, L- hotdog and corn, chips, snack- pasta salad with oranges, D- turkey burger on vickie with sweet potato fries, Dessert- cake Enjoys salads, cucumbers, shrimp, chicken, hummus Servings of fruit: 1 Servings of Vegetables: 2 Eats dinner at the table with family: 7 x per week Eats breakfast : 4 x per week Fast food or take-out: 4 x per week, eats take out, no fast food Water Intake: 5 bottles of water per day Soda/Juice/or Gatorade: juice sometimes, no soda, no gateraid Milk: no Physical Activity: Enjoys swimming, was on the swim team last year[place] and active throughout the day Sleep: ? off and on TV/ computer or phone in bedroom- yes Gyn Hx: Menarche- 12 years old, LMP- 6/30/23, has Irregular, long, and heavy periods. Denies hirsuitism, + acne  8/29/23: Lost 11 lbs. Feels well, has been tolerating 3 tabs of metformin per day. Denies GI side effects and feels that it is helping with portion control. Able to fit into clothes from a few years ago. Reviewed lab results with pt and her mother- given print out to bring to her upcoming GYN apt. Le reports making healthier food choices and feels full quicker.  10/3/23: Lost 23 lbs total. Feels better in clothes and feels that metformin was helpful with cravings . Got up to 4 tabs per day, but started to feel gas pains and vomited one night when she took it late at night. Reports eating better overall and smaller portions. Saw GYN who plans on doing a US and starting birth control.  11/7/23: Lost 10 lbs, down 33 lbs total. Has been able to tolerate 500-1000 mg of metformin daily, starts to get stomach discomfort when taking 2 tabs daily. Continues to focus on eating healthier and limiting snacking to 2 times per day. Feels less hunger while taking medication. Walking for exercise. Plans on starting birth control  1/25/24: B- granola bar, L- sandwhich and chips and cucumber, D- meat, vegetable, and potato. Feels more hungry since she if off metformin x 3 weeks. + Dizziness with brisk movement, notices when getting out of bed quickly or standing quickly. Denies feeling lightheaded or diaphoretic. BP lower since weight loss. Noticed that the dizziness started after starting Blisovi fe OCP. Plans on changing to [place] this week instead. Discontinued taking metformin 3 weeks ago and continues to experience these symptoms. Does report taking metformin with advil sinus, but continues to have symptoms of low BP while off metformin.  2/15/24: Feels better overall. 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 Feels better after switching oral contraceptive to louis. Saw ENT and currently taking antibiotics and steroids for sinus issues. Eating healthy foods throughout the day, but has trouble consistently eating breakfast. Open to starting back on Metformin  4/30/24: Lost 48 lbs total. Discontinued taking birth control d/t side effects. Taking & tolerating metformin 500 mg with dinner. Incorporating breakfast lately B- apple with cheerios, sometimes skips lunch so will eat a small meal after school or some snacks, D- regular meal. Walks 1-2 miles most days, playing tennis today.  11/14/24: Lost an additional 6 lbs. In a school play currently. Continues to take metformin 750 mg with evening meal, feels that it isn't as effective with additional weight loss or cravings. Continues to make healthy food choices overall. Periods are very irregular, denies taking birth control at present, felt that it was making her gain weight. Wants to lose additional weight, wants to discuss option for injectable medications for weight.   12/12/24: Lost 6 lbs. Continues to take metformin 750 mg tab once daily, sometimes inconsistently. Wants to discuss option for additional medicine. Insurance denied coverage for wegovy, wants to discuss self pay instead. Reports being less hungry overall, eats 2 meals per day and has been active with school plays.    1/2/25: Patient is starting Ozempic treatment, brought in medication today- has 2 mg dose pen and need education to start treatment. Has been inconsistent with metformin treatment while on school break.  Patient recently visited [place], gained 3 lbs during holiday season.   1/30/25: Doing well overall- taking ozempic 0.25 mg weekly and notices a decreased appetite. Eats 2-3 small meals throughout the day. Drinking water. Has been on and off metformin. Feels well overall, but surprised that she hasn't seen more movement in the scale. Went down a dress size. Doing [employer] musical at school. Getting periods every 3 months, more heavy lately.   2/20/25: Doing well, lost an additional 7 lbs, taking ozempic 0.25 mg weekly. C/o having heavy irregular periods, feels dizzy when she gets out of bed quickly or stands up quickly. BP Low today. Plans on seeing GYN tomorrow. Eats 2-3 meals daily. Normal BMs   5/12/25: Taking ozempic 0.25 mg, discontinued taking metformin on her own, last dose over a month ago. Reports having more dizzy symptoms and low BP at a recent Urgent Care apt. Eating 2 melas per day, had an Acai bowl today. Wants to increase dose of medicine, but not sure if this is the right choice at this time.   5/15/25: Patient reports feeling a little dizzy, especially when getting off the couch. Dizziness occurs daily. Patient had an acai bowl for breakfast and lunch. Patient experiences visual disturbances ('all black' or spots) when standing up quickly. Able to get through the day with adequate energy. Reports increased appetite lately on the 0.25 mg dose of ozempic, wondering if she could increase the dose. Saw cardiology about a year ago who had recommended increase in salt intake or Liquid IV to help with dizziness, denies following this recommendation.    6/10/25: Lost 8 lbs since last apt. Increased the dose to ozempic 0.5 mg weekly. The patient reports ongoing constipation issues since starting Ozempic. She experiences nausea and feels like she hasn't fully emptied her bowels. The patient has been taking three fiber supplements daily and two Senna. 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 She reports no changes in dizziness. Getting enough nourishment daily, water intake could improve.

## 2025-06-10 NOTE — ASSESSMENT
[FreeTextEntry1] : - Summary : Continue Ozempic with decreased dose adjustment. Address constipation and provide symptomatic relief. - Plan : - Consider lowering Ozempic dose  - Recommend increasing water intake to 64 ounces daily - Prescribe Zofran for nausea relief PRN - Advise on proper injection technique to minimize bruising - Encourage continued physical activity - Schedule follow-up appointment in one month